# Patient Record
Sex: MALE | Race: WHITE | NOT HISPANIC OR LATINO | Employment: FULL TIME | URBAN - METROPOLITAN AREA
[De-identification: names, ages, dates, MRNs, and addresses within clinical notes are randomized per-mention and may not be internally consistent; named-entity substitution may affect disease eponyms.]

---

## 2023-08-09 ENCOUNTER — TELEPHONE (OUTPATIENT)
Dept: SURGERY | Facility: CLINIC | Age: 33
End: 2023-08-09
Payer: COMMERCIAL

## 2023-08-09 NOTE — TELEPHONE ENCOUNTER
[facility]    Phone Message    May a detailed message be left on voicemail: yes     Reason for Call: Other: Pt called and wanted to schedule for rectal bleeding, he was told to schedule with Martine Esteves 8/14/23 8:30am. Writer unable to see that scheduled time for patient. Records with [facility]. Please review and call pt to schedule, thank you      Action Taken: Message routed to:  [facility] colon and rectal     Travel Screening: Not Applicable

## 2023-08-09 NOTE — TELEPHONE ENCOUNTER
Diagnosis, Referred by & from: Rectal Bleeding   Appt date: 8/14/2023   NOTES STATUS DETAILS   OFFICE NOTE from referring provider N/A    OFFICE NOTE from other specialist Care Everywhere [facility]:  8/5/23 - UC OV with Dr. Salcedo  11/30/21 - PCC OV with Dr. Chauncey León:  5/6/21 - P[facility] OV with Dr. Johnson   DISCHARGE SUMMARY from hospital N/A    DISCHARGE REPORT from the ER N/A    OPERATIVE REPORT N/A    MEDICATION LIST Care Everywhere    LABS N/A    DIAGNOSTIC PROCEDURES N/A    IMAGING (DISC & REPORT)      XRAY Received [facility]:  11/30/21 - XR Abdomen     Records Requested  08/09/23    Facility  [facility]  Fax: [phone]   Outcome * 8/9/23 1:57 PM Faxed urg req to  for images to be pushed to [facility]. - Ciera    * 8/9/23 3:46 PM Images received from  and attached to the patient in PACs. - Ciera

## 2023-08-14 ENCOUNTER — PRE VISIT (OUTPATIENT)
Dept: SURGERY | Facility: CLINIC | Age: 33
End: 2023-08-14

## 2023-08-14 ENCOUNTER — OFFICE VISIT (OUTPATIENT)
Dept: SURGERY | Facility: CLINIC | Age: 33
End: 2023-08-14
Payer: COMMERCIAL

## 2023-08-14 VITALS — OXYGEN SATURATION: 98 % | DIASTOLIC BLOOD PRESSURE: 76 MMHG | HEART RATE: 80 BPM | SYSTOLIC BLOOD PRESSURE: 117 MMHG

## 2023-08-14 DIAGNOSIS — K64.8 INTERNAL HEMORRHOID: ICD-10-CM

## 2023-08-14 DIAGNOSIS — K62.5 RECTAL BLEEDING: Primary | ICD-10-CM

## 2023-08-14 PROCEDURE — 99202 OFFICE O/P NEW SF 15 MIN: CPT

## 2023-08-14 ASSESSMENT — PAIN SCALES - GENERAL: PAINLEVEL: NO PAIN (0)

## 2023-08-14 ASSESSMENT — ENCOUNTER SYMPTOMS
NAUSEA: 0
HEARTBURN: 0
ABDOMINAL PAIN: 0
BOWEL INCONTINENCE: 1
DIARRHEA: 1
CONSTIPATION: 1
BLOOD IN STOOL: 1
VOMITING: 0
JAUNDICE: 0
BLOATING: 0

## 2023-08-14 NOTE — LETTER
2023       RE: Shar Oconnell  [address] [place] [address]     Dear Colleague,    Thank you for referring your patient, Shar Oconnell, to the [facility] COLON AND RECTAL SURGERY CLINIC [place] at [facility]. Please see a copy of my visit note below.    Colon and Rectal Surgery Consult Clinic Note    Date: 2023     RE: Shar Oconnell  : 1990  PILAR: 2023    Shar Oconnell is a very pleasant 33 year old male here with concerns for rectal bleeding.    Shar reports noticing rectal bleeding for the last week. He describes it as streaks of blood in the stool and also bright red blood with wiping. He typically has 2-3 bowel movements a day, but in the last week he has been constipated. He is having more frequent and smaller bowel movements lately. Other than this last week, he also had rectal bleeding about 2 years ago. He denies any rectal bleeding outside of these two episodes. He denies rectal pain with bowel movements unless he is very constipated and strains a lot.     No prior colonoscopy. No family history of colorectal cancer.    Physical Examination: Exam was chaperoned by Vipul Rodriguez, EMT-P   /76 (BP Location: Left arm, Patient Position: Sitting, Cuff Size: Adult Regular)   Pulse 80   SpO2 98%   General: alert, oriented, in no acute distress, sitting comfortably  HEENT: moist mucous membranes  Perianal external examination:  Perianal skin: Intact with no excoriation or lichenification.  Lesions: No evidence of an external lesion, nodularity, or induration in the perianal region.  Eversion of buttocks: There was not evidence of an anal fissure. Details: N/A.  Skin tags or external hemorrhoids: None.    Digital rectal examination: Was performed.   Sphincter tone: Good.  Palpable lesions: No.  Prostate: Not assessed.  Other: None.    Anoscopy: Was performed.   Hemorrhoids: Yes. Moderate sized internal  hemorrhoids in all columns. No active bleeding.  Lesions: No    Assessment/Plan: Shar Oconnell is a 33 year old male with rectal bleeding. We discussed that this is most likely caused by internal hemorrhoids. Will get a colonoscopy to make sure there are no other causes of bleeding. In the meantime, start a daily fiber supplement. If bleeding persists and colonoscopy is negative, then can consider banding in clinic, though I am not sure he would tolerate this in clinic. Return to clinic as needed. Patient's questions were answered to his stated satisfaction and he is in agreement with this plan.     Medical history:  No past medical history on file.    Surgical history:  No past surgical history on file.    Problem list:  There are no problems to display for this patient.      Medications:  No current outpatient medications on file.       Allergies:  No Known Allergies    Family history:  No family history on file.    Social history:  Social History     Tobacco Use    Smoking status: Former     Types: Cigarettes    Smokeless tobacco: Never   Substance Use Topics    Alcohol use: Not on file    Marital status: single.    Nursing Notes:   Vipul Rodriguez, EMT  8/14/2023  8:26 AM  Signed  Chief Complaint   Patient presents with    Consult       Vitals:    08/14/23 0824   BP: 117/76   BP Location: Left arm   Patient Position: Sitting   Cuff Size: Adult Regular   Pulse: 80   SpO2: 98%       There is no height or weight on file to calculate BMI.    Vipul Rodriguez EMT-P       25 minutes spent on the date of encounter performing chart review, history and exam, documentation and further activities as noted above with an additional 2 minutes for anoscopy.           Again, thank you for allowing me to participate in the care of your patient.      Sincerely,    Martine Esteves PA-C

## 2023-08-14 NOTE — NURSING NOTE
Chief Complaint   Patient presents with    Consult       Vitals:    08/14/23 0824   BP: 117/76   BP Location: Left arm   Patient Position: Sitting   Cuff Size: Adult Regular   Pulse: 80   SpO2: 98%       There is no height or weight on file to calculate BMI.    Vipul Rodriguez EMT-P

## 2023-08-14 NOTE — PROGRESS NOTES
Colon and Rectal Surgery Consult Clinic Note    Date: 2023     RE: Shar Oconnell  : 1990  PILAR: 2023    Shar Oconnell is a very pleasant 33 year old male here with concerns for rectal bleeding.    Shar reports noticing rectal bleeding for the last week. He describes it as streaks of blood in the stool and also bright red blood with wiping. He typically has 2-3 bowel movements a day, but in the last week he has been constipated. He is having more frequent and smaller bowel movements lately. Other than this last week, he also had rectal bleeding about 2 years ago. He denies any rectal bleeding outside of these two episodes. He denies rectal pain with bowel movements unless he is very constipated and strains a lot.     No prior colonoscopy. No family history of colorectal cancer.    Physical Examination: Exam was chaperoned by Vipul Rodriguez, EMT-P   /76 (BP Location: Left arm, Patient Position: Sitting, Cuff Size: Adult Regular)   Pulse 80   SpO2 98%   General: alert, oriented, in no acute distress, sitting comfortably  HEENT: moist mucous membranes  Perianal external examination:  Perianal skin: Intact with no excoriation or lichenification.  Lesions: No evidence of an external lesion, nodularity, or induration in the perianal region.  Eversion of buttocks: There was not evidence of an anal fissure. Details: N/A.  Skin tags or external hemorrhoids: None.    Digital rectal examination: Was performed.   Sphincter tone: Good.  Palpable lesions: No.  Prostate: Not assessed.  Other: None.    Anoscopy: Was performed.   Hemorrhoids: Yes. Moderate sized internal hemorrhoids in all columns. No active bleeding.  Lesions: No    Assessment/Plan: Shar Oconnell is a 33 year old male with rectal bleeding. We discussed that this is most likely caused by internal hemorrhoids. Will get a colonoscopy to make sure there are no other causes of bleeding. In the meantime, start a daily fiber supplement.  If bleeding persists and colonoscopy is negative, then can consider banding in clinic, though I am not sure he would tolerate this in clinic. Return to clinic as needed. Patient's questions were answered to his stated satisfaction and he is in agreement with this plan.     Medical history:  No past medical history on file.    Surgical history:  No past surgical history on file.    Problem list:  There are no problems to display for this patient.      Medications:  No current outpatient medications on file.       Allergies:  No Known Allergies    Family history:  No family history on file.    Social history:  Social History     Tobacco Use    Smoking status: Former     Types: Cigarettes    Smokeless tobacco: Never   Substance Use Topics    Alcohol use: Not on file    Marital status: single.    Nursing Notes:   Vipul Rodriguez, EMT  8/14/2023  8:26 AM  Signed  Chief Complaint   Patient presents with    Consult       Vitals:    08/14/23 0824   BP: 117/76   BP Location: Left arm   Patient Position: Sitting   Cuff Size: Adult Regular   Pulse: 80   SpO2: 98%       There is no height or weight on file to calculate BMI.    Vipul Rodriguez EMT-P       25 minutes spent on the date of encounter performing chart review, history and exam, documentation and further activities as noted above with an additional 2 minutes for anoscopy.     ----------------------------------------------------  Martine Esteves PA-C  Colon and Rectal Surgery   [facility]

## 2023-08-19 ENCOUNTER — HEALTH MAINTENANCE LETTER (OUTPATIENT)
Age: 33
End: 2023-08-19

## 2024-10-12 ENCOUNTER — HEALTH MAINTENANCE LETTER (OUTPATIENT)
Age: 34
End: 2024-10-12